# Patient Record
Sex: MALE
[De-identification: names, ages, dates, MRNs, and addresses within clinical notes are randomized per-mention and may not be internally consistent; named-entity substitution may affect disease eponyms.]

---

## 2020-09-13 ENCOUNTER — TRANSCRIPTION ENCOUNTER (OUTPATIENT)
Age: 25
End: 2020-09-13

## 2020-11-19 ENCOUNTER — TRANSCRIPTION ENCOUNTER (OUTPATIENT)
Age: 25
End: 2020-11-19

## 2021-03-04 ENCOUNTER — TRANSCRIPTION ENCOUNTER (OUTPATIENT)
Age: 26
End: 2021-03-04

## 2021-03-24 PROBLEM — Z00.00 ENCOUNTER FOR PREVENTIVE HEALTH EXAMINATION: Status: ACTIVE | Noted: 2021-03-24

## 2021-03-26 ENCOUNTER — RESULT REVIEW (OUTPATIENT)
Age: 26
End: 2021-03-26

## 2021-03-26 ENCOUNTER — APPOINTMENT (OUTPATIENT)
Dept: ORTHOPEDIC SURGERY | Facility: CLINIC | Age: 26
End: 2021-03-26
Payer: COMMERCIAL

## 2021-03-26 ENCOUNTER — OUTPATIENT (OUTPATIENT)
Dept: OUTPATIENT SERVICES | Facility: HOSPITAL | Age: 26
LOS: 1 days | End: 2021-03-26
Payer: COMMERCIAL

## 2021-03-26 VITALS — BODY MASS INDEX: 22.96 KG/M2 | WEIGHT: 155 LBS | HEIGHT: 69 IN

## 2021-03-26 VITALS — HEIGHT: 69 IN | BODY MASS INDEX: 22.96 KG/M2 | WEIGHT: 155 LBS

## 2021-03-26 DIAGNOSIS — Z87.09 PERSONAL HISTORY OF OTHER DISEASES OF THE RESPIRATORY SYSTEM: ICD-10-CM

## 2021-03-26 DIAGNOSIS — Z82.49 FAMILY HISTORY OF ISCHEMIC HEART DISEASE AND OTHER DISEASES OF THE CIRCULATORY SYSTEM: ICD-10-CM

## 2021-03-26 DIAGNOSIS — Z78.9 OTHER SPECIFIED HEALTH STATUS: ICD-10-CM

## 2021-03-26 DIAGNOSIS — Z80.9 FAMILY HISTORY OF MALIGNANT NEOPLASM, UNSPECIFIED: ICD-10-CM

## 2021-03-26 DIAGNOSIS — S42.022A DISPLACED FRACTURE OF SHAFT OF LEFT CLAVICLE, INITIAL ENCOUNTER FOR CLOSED FRACTURE: ICD-10-CM

## 2021-03-26 DIAGNOSIS — Z83.3 FAMILY HISTORY OF DIABETES MELLITUS: ICD-10-CM

## 2021-03-26 PROCEDURE — 99072 ADDL SUPL MATRL&STAF TM PHE: CPT

## 2021-03-26 PROCEDURE — 73000 X-RAY EXAM OF COLLAR BONE: CPT | Mod: 26,LT

## 2021-03-26 PROCEDURE — 99204 OFFICE O/P NEW MOD 45 MIN: CPT

## 2021-03-26 PROCEDURE — 73000 X-RAY EXAM OF COLLAR BONE: CPT

## 2021-03-28 NOTE — HISTORY OF PRESENT ILLNESS
[de-identified] : Karan injured his nondominant left shoulder on 1–20–21 after a fall while skiing in [place].  He underwent ORIF of a displaced left clavicle fracture the next day and presents today for follow-up since he lives in [place].\par \par He states that left shoulder function was normal prior to this injury and he currently takes no medicines for pain and has regained full activities of daily living.  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

## 2021-03-28 NOTE — PHYSICAL EXAM
[de-identified] : CONSTITUTIONAL: Patient is well-developed, well-nourished and appropriately groomed.\par SKIN: There are no rashes, no ulcers, and no café au lait spots in the upper extremities, face or cervical region.\par CARDIOVASCULAR: Both distal upper extremities are warm and the fingers have good capillary refill. Normal radial pulses bilaterally.\par RESPIRATORY: The patient is breathing normally and in no acute distress. \par HEMATOLOGICAL/LYMPHATIC: There is no lymphedema in either upper extremity. No cervical adenopathy, no axillary adenopathy.\par PSYCH: The patient is alert and oriented x 3;  appropriately groomed and dressed.\par NEUROLOGIC: Normal gait and station.\par MUSCULOSKELETAL:\par \par The left shoulder superior clavicle incision is well-healed and the clavicle is anatomically reduced.  He has full active and passive range of motion of the left shoulder with normal strength of external rotation and no pain.\par \par The contralateral shoulder has full active and passive range of motion with 170° elevation, 70° external rotation, and internal rotation to the seventh thoracic vertebra without pain. There is no focal tenderness and no crepitus with passive rotation of the glenohumeral joint.\par \par \par The cervical spine has full passive and active range of motion without pain. There is no focal tenderness in the paracervical muscles nor in the trapezius or parascapular muscles. Distally the motor and sensory exam is normal in both upper extremities.\par \par Distally, the hands are warm and well perfused with no signs of lymphedema or swelling. The radial pulse is present and normal.\par

## 2021-03-28 NOTE — DISCUSSION/SUMMARY
[Medication Risks Reviewed] : Medication risks reviewed [Surgical risks reviewed] : Surgical risks reviewed [de-identified] : I ordered and reviewed radiographs of the left clavicle today that show an anatomically reduced clavicle fracture with hardware in satisfactory position.\par \par I explained to Karan that he has regained normal function of the left shoulder for activities of daily living but at 2 months following his injury the bone is not fully healed.  I reviewed with him the rehabilitation program and a gradual return to full activities over the next 6 weeks.  I also reviewed with him that 15% of patients have pain over the hardware and request hardware removal at 9 to 12 months following surgery.\par \par If he has not regained full activities in 6 weeks, he should return for my repeat evaluation.  If he has tenderness over the hardware in the fall 2021, he should return for my repeat evaluation and discussion regarding hardware removal.\par \par Today his clinical left shoulder [employer] score is 100 on a scale of 100 indicating excellent and normal shoulder function.

## 2022-07-14 ENCOUNTER — NON-APPOINTMENT (OUTPATIENT)
Age: 27
End: 2022-07-14